# Patient Record
Sex: MALE | Race: BLACK OR AFRICAN AMERICAN | Employment: UNEMPLOYED | URBAN - METROPOLITAN AREA
[De-identification: names, ages, dates, MRNs, and addresses within clinical notes are randomized per-mention and may not be internally consistent; named-entity substitution may affect disease eponyms.]

---

## 2021-10-01 ENCOUNTER — APPOINTMENT (OUTPATIENT)
Dept: GENERAL RADIOLOGY | Age: 26
DRG: 439 | End: 2021-10-01

## 2021-10-01 ENCOUNTER — APPOINTMENT (OUTPATIENT)
Dept: CT IMAGING | Age: 26
DRG: 439 | End: 2021-10-01

## 2021-10-01 ENCOUNTER — HOSPITAL ENCOUNTER (INPATIENT)
Age: 26
LOS: 3 days | Discharge: HOME | DRG: 439 | End: 2021-10-04
Attending: INTERNAL MEDICINE | Admitting: INTERNAL MEDICINE

## 2021-10-01 DIAGNOSIS — R56.9 SEIZURE (HCC): Primary | ICD-10-CM

## 2021-10-01 DIAGNOSIS — K85.20 ALCOHOL-INDUCED ACUTE PANCREATITIS, UNSPECIFIED COMPLICATION STATUS: ICD-10-CM

## 2021-10-01 DIAGNOSIS — F10.20 ALCOHOLISM (HCC): ICD-10-CM

## 2021-10-01 DIAGNOSIS — F10.939 ALCOHOL WITHDRAWAL SYNDROME WITH COMPLICATION (HCC): ICD-10-CM

## 2021-10-01 PROBLEM — K85.90 ACUTE PANCREATITIS WITHOUT NECROSIS OR INFECTION, UNSPECIFIED: Status: ACTIVE | Noted: 2021-10-01

## 2021-10-01 LAB
A/G RATIO: 1.3 (ref 1.1–2.2)
ALBUMIN SERPL-MCNC: 4.2 G/DL (ref 3.4–5)
ALP BLD-CCNC: 71 U/L (ref 40–129)
ALT SERPL-CCNC: 26 U/L (ref 10–40)
AMPHETAMINE SCREEN, URINE: ABNORMAL
AMYLASE: 1242 U/L (ref 25–115)
ANION GAP SERPL CALCULATED.3IONS-SCNC: 13 MMOL/L (ref 3–16)
AST SERPL-CCNC: 63 U/L (ref 15–37)
BARBITURATE SCREEN URINE: ABNORMAL
BASOPHILS ABSOLUTE: 0 K/UL (ref 0–0.2)
BASOPHILS RELATIVE PERCENT: 0.1 %
BENZODIAZEPINE SCREEN, URINE: ABNORMAL
BILIRUB SERPL-MCNC: 0.5 MG/DL (ref 0–1)
BILIRUBIN URINE: NEGATIVE
BLOOD, URINE: ABNORMAL
BUN BLDV-MCNC: 9 MG/DL (ref 7–20)
CALCIUM SERPL-MCNC: 9.6 MG/DL (ref 8.3–10.6)
CANNABINOID SCREEN URINE: POSITIVE
CHLORIDE BLD-SCNC: 98 MMOL/L (ref 99–110)
CLARITY: ABNORMAL
CO2: 25 MMOL/L (ref 21–32)
COCAINE METABOLITE SCREEN URINE: ABNORMAL
COLOR: YELLOW
CREAT SERPL-MCNC: 0.7 MG/DL (ref 0.9–1.3)
EKG ATRIAL RATE: 93 BPM
EKG DIAGNOSIS: NORMAL
EKG P AXIS: 67 DEGREES
EKG P-R INTERVAL: 136 MS
EKG Q-T INTERVAL: 322 MS
EKG QRS DURATION: 86 MS
EKG QTC CALCULATION (BAZETT): 400 MS
EKG R AXIS: 69 DEGREES
EKG T AXIS: 38 DEGREES
EKG VENTRICULAR RATE: 93 BPM
EOSINOPHILS ABSOLUTE: 0 K/UL (ref 0–0.6)
EOSINOPHILS RELATIVE PERCENT: 0.1 %
EPITHELIAL CELLS, UA: 1 /HPF (ref 0–5)
ETHANOL: NORMAL MG/DL (ref 0–0.08)
GFR AFRICAN AMERICAN: >60
GFR NON-AFRICAN AMERICAN: >60
GLOBULIN: 3.2 G/DL
GLUCOSE BLD-MCNC: 97 MG/DL (ref 70–99)
GLUCOSE URINE: NEGATIVE MG/DL
HCT VFR BLD CALC: 39.6 % (ref 40.5–52.5)
HEMOGLOBIN: 13.6 G/DL (ref 13.5–17.5)
HYALINE CASTS: 4 /LPF (ref 0–8)
KETONES, URINE: NEGATIVE MG/DL
LACTIC ACID, SEPSIS: 1.2 MMOL/L (ref 0.4–1.9)
LACTIC ACID, SEPSIS: 3.9 MMOL/L (ref 0.4–1.9)
LEUKOCYTE ESTERASE, URINE: NEGATIVE
LIPASE: 1329 U/L (ref 13–60)
LYMPHOCYTES ABSOLUTE: 0.5 K/UL (ref 1–5.1)
LYMPHOCYTES RELATIVE PERCENT: 3.9 %
Lab: ABNORMAL
MCH RBC QN AUTO: 31.9 PG (ref 26–34)
MCHC RBC AUTO-ENTMCNC: 34.4 G/DL (ref 31–36)
MCV RBC AUTO: 92.6 FL (ref 80–100)
METHADONE SCREEN, URINE: ABNORMAL
MICROSCOPIC EXAMINATION: YES
MONOCYTES ABSOLUTE: 1.1 K/UL (ref 0–1.3)
MONOCYTES RELATIVE PERCENT: 9.5 %
NEUTROPHILS ABSOLUTE: 10.4 K/UL (ref 1.7–7.7)
NEUTROPHILS RELATIVE PERCENT: 86.4 %
NITRITE, URINE: NEGATIVE
OPIATE SCREEN URINE: ABNORMAL
OXYCODONE URINE: ABNORMAL
PDW BLD-RTO: 13.2 % (ref 12.4–15.4)
PH UA: 6
PH UA: 6 (ref 5–8)
PHENCYCLIDINE SCREEN URINE: ABNORMAL
PLATELET # BLD: 120 K/UL (ref 135–450)
PMV BLD AUTO: 8 FL (ref 5–10.5)
POTASSIUM REFLEX MAGNESIUM: 4.1 MMOL/L (ref 3.5–5.1)
PROPOXYPHENE SCREEN: ABNORMAL
PROTEIN UA: 30 MG/DL
RBC # BLD: 4.28 M/UL (ref 4.2–5.9)
RBC UA: 3 /HPF (ref 0–4)
SODIUM BLD-SCNC: 136 MMOL/L (ref 136–145)
SPECIFIC GRAVITY UA: 1.01 (ref 1–1.03)
TOTAL PROTEIN: 7.4 G/DL (ref 6.4–8.2)
URINE REFLEX TO CULTURE: ABNORMAL
URINE TYPE: ABNORMAL
UROBILINOGEN, URINE: 0.2 E.U./DL
WBC # BLD: 12 K/UL (ref 4–11)
WBC UA: 7 /HPF (ref 0–5)

## 2021-10-01 PROCEDURE — 6360000004 HC RX CONTRAST MEDICATION: Performed by: GENERAL ACUTE CARE HOSPITAL

## 2021-10-01 PROCEDURE — 87040 BLOOD CULTURE FOR BACTERIA: CPT

## 2021-10-01 PROCEDURE — 6360000002 HC RX W HCPCS: Performed by: INTERNAL MEDICINE

## 2021-10-01 PROCEDURE — 80053 COMPREHEN METABOLIC PANEL: CPT

## 2021-10-01 PROCEDURE — 93010 ELECTROCARDIOGRAM REPORT: CPT | Performed by: INTERNAL MEDICINE

## 2021-10-01 PROCEDURE — 70450 CT HEAD/BRAIN W/O DYE: CPT

## 2021-10-01 PROCEDURE — 85025 COMPLETE CBC W/AUTO DIFF WBC: CPT

## 2021-10-01 PROCEDURE — 2580000003 HC RX 258: Performed by: GENERAL ACUTE CARE HOSPITAL

## 2021-10-01 PROCEDURE — 99285 EMERGENCY DEPT VISIT HI MDM: CPT

## 2021-10-01 PROCEDURE — 2580000003 HC RX 258: Performed by: INTERNAL MEDICINE

## 2021-10-01 PROCEDURE — 96365 THER/PROPH/DIAG IV INF INIT: CPT

## 2021-10-01 PROCEDURE — 6370000000 HC RX 637 (ALT 250 FOR IP): Performed by: INTERNAL MEDICINE

## 2021-10-01 PROCEDURE — 1200000000 HC SEMI PRIVATE

## 2021-10-01 PROCEDURE — 2060000000 HC ICU INTERMEDIATE R&B

## 2021-10-01 PROCEDURE — 82077 ASSAY SPEC XCP UR&BREATH IA: CPT

## 2021-10-01 PROCEDURE — 96375 TX/PRO/DX INJ NEW DRUG ADDON: CPT

## 2021-10-01 PROCEDURE — U0005 INFEC AGEN DETEC AMPLI PROBE: HCPCS

## 2021-10-01 PROCEDURE — 83605 ASSAY OF LACTIC ACID: CPT

## 2021-10-01 PROCEDURE — 6360000002 HC RX W HCPCS: Performed by: GENERAL ACUTE CARE HOSPITAL

## 2021-10-01 PROCEDURE — U0003 INFECTIOUS AGENT DETECTION BY NUCLEIC ACID (DNA OR RNA); SEVERE ACUTE RESPIRATORY SYNDROME CORONAVIRUS 2 (SARS-COV-2) (CORONAVIRUS DISEASE [COVID-19]), AMPLIFIED PROBE TECHNIQUE, MAKING USE OF HIGH THROUGHPUT TECHNOLOGIES AS DESCRIBED BY CMS-2020-01-R: HCPCS

## 2021-10-01 PROCEDURE — 82150 ASSAY OF AMYLASE: CPT

## 2021-10-01 PROCEDURE — 71045 X-RAY EXAM CHEST 1 VIEW: CPT

## 2021-10-01 PROCEDURE — 2500000003 HC RX 250 WO HCPCS: Performed by: INTERNAL MEDICINE

## 2021-10-01 PROCEDURE — 96361 HYDRATE IV INFUSION ADD-ON: CPT

## 2021-10-01 PROCEDURE — 93005 ELECTROCARDIOGRAM TRACING: CPT | Performed by: GENERAL ACUTE CARE HOSPITAL

## 2021-10-01 PROCEDURE — 80307 DRUG TEST PRSMV CHEM ANLYZR: CPT

## 2021-10-01 PROCEDURE — 81001 URINALYSIS AUTO W/SCOPE: CPT

## 2021-10-01 PROCEDURE — 74177 CT ABD & PELVIS W/CONTRAST: CPT

## 2021-10-01 PROCEDURE — 6360000002 HC RX W HCPCS

## 2021-10-01 PROCEDURE — 6370000000 HC RX 637 (ALT 250 FOR IP): Performed by: GENERAL ACUTE CARE HOSPITAL

## 2021-10-01 PROCEDURE — 83690 ASSAY OF LIPASE: CPT

## 2021-10-01 RX ORDER — OXYCODONE HYDROCHLORIDE 5 MG/1
5 TABLET ORAL EVERY 6 HOURS PRN
Status: DISCONTINUED | OUTPATIENT
Start: 2021-10-01 | End: 2021-10-04 | Stop reason: HOSPADM

## 2021-10-01 RX ORDER — LORAZEPAM 1 MG/1
3 TABLET ORAL
Status: DISCONTINUED | OUTPATIENT
Start: 2021-10-01 | End: 2021-10-04 | Stop reason: HOSPADM

## 2021-10-01 RX ORDER — MORPHINE SULFATE 2 MG/ML
2 INJECTION, SOLUTION INTRAMUSCULAR; INTRAVENOUS EVERY 4 HOURS PRN
Status: DISCONTINUED | OUTPATIENT
Start: 2021-10-01 | End: 2021-10-04 | Stop reason: HOSPADM

## 2021-10-01 RX ORDER — ACETAMINOPHEN 325 MG/1
650 TABLET ORAL EVERY 6 HOURS PRN
Status: DISCONTINUED | OUTPATIENT
Start: 2021-10-01 | End: 2021-10-04 | Stop reason: HOSPADM

## 2021-10-01 RX ORDER — LORAZEPAM 2 MG/ML
1 INJECTION INTRAMUSCULAR ONCE
Status: COMPLETED | OUTPATIENT
Start: 2021-10-01 | End: 2021-10-01

## 2021-10-01 RX ORDER — 0.9 % SODIUM CHLORIDE 0.9 %
2000 INTRAVENOUS SOLUTION INTRAVENOUS ONCE
Status: COMPLETED | OUTPATIENT
Start: 2021-10-01 | End: 2021-10-01

## 2021-10-01 RX ORDER — ACETAMINOPHEN 650 MG/1
650 SUPPOSITORY RECTAL EVERY 6 HOURS PRN
Status: DISCONTINUED | OUTPATIENT
Start: 2021-10-01 | End: 2021-10-04 | Stop reason: HOSPADM

## 2021-10-01 RX ORDER — SODIUM CHLORIDE 0.9 % (FLUSH) 0.9 %
5-40 SYRINGE (ML) INJECTION PRN
Status: DISCONTINUED | OUTPATIENT
Start: 2021-10-01 | End: 2021-10-04 | Stop reason: HOSPADM

## 2021-10-01 RX ORDER — LORAZEPAM 2 MG/ML
2 INJECTION INTRAMUSCULAR
Status: DISCONTINUED | OUTPATIENT
Start: 2021-10-01 | End: 2021-10-04 | Stop reason: HOSPADM

## 2021-10-01 RX ORDER — LORAZEPAM 1 MG/1
2 TABLET ORAL
Status: DISCONTINUED | OUTPATIENT
Start: 2021-10-01 | End: 2021-10-04 | Stop reason: HOSPADM

## 2021-10-01 RX ORDER — LORAZEPAM 2 MG/ML
INJECTION INTRAMUSCULAR
Status: COMPLETED
Start: 2021-10-01 | End: 2021-10-01

## 2021-10-01 RX ORDER — ONDANSETRON 4 MG/1
4 TABLET, ORALLY DISINTEGRATING ORAL EVERY 8 HOURS PRN
Status: DISCONTINUED | OUTPATIENT
Start: 2021-10-01 | End: 2021-10-04 | Stop reason: HOSPADM

## 2021-10-01 RX ORDER — ACETAMINOPHEN 500 MG
1000 TABLET ORAL ONCE
Status: COMPLETED | OUTPATIENT
Start: 2021-10-01 | End: 2021-10-01

## 2021-10-01 RX ORDER — POLYETHYLENE GLYCOL 3350 17 G/17G
17 POWDER, FOR SOLUTION ORAL DAILY PRN
Status: DISCONTINUED | OUTPATIENT
Start: 2021-10-01 | End: 2021-10-04 | Stop reason: HOSPADM

## 2021-10-01 RX ORDER — SODIUM CHLORIDE 0.9 % (FLUSH) 0.9 %
5-40 SYRINGE (ML) INJECTION EVERY 12 HOURS SCHEDULED
Status: DISCONTINUED | OUTPATIENT
Start: 2021-10-01 | End: 2021-10-04 | Stop reason: HOSPADM

## 2021-10-01 RX ORDER — SODIUM CHLORIDE 9 MG/ML
25 INJECTION, SOLUTION INTRAVENOUS PRN
Status: DISCONTINUED | OUTPATIENT
Start: 2021-10-01 | End: 2021-10-04 | Stop reason: HOSPADM

## 2021-10-01 RX ORDER — LORAZEPAM 2 MG/ML
4 INJECTION INTRAMUSCULAR
Status: DISCONTINUED | OUTPATIENT
Start: 2021-10-01 | End: 2021-10-04 | Stop reason: HOSPADM

## 2021-10-01 RX ORDER — LEVETIRACETAM 10 MG/ML
1000 INJECTION INTRAVASCULAR ONCE
Status: COMPLETED | OUTPATIENT
Start: 2021-10-01 | End: 2021-10-01

## 2021-10-01 RX ORDER — ONDANSETRON 2 MG/ML
4 INJECTION INTRAMUSCULAR; INTRAVENOUS EVERY 6 HOURS PRN
Status: DISCONTINUED | OUTPATIENT
Start: 2021-10-01 | End: 2021-10-04 | Stop reason: HOSPADM

## 2021-10-01 RX ORDER — LORAZEPAM 1 MG/1
4 TABLET ORAL
Status: DISCONTINUED | OUTPATIENT
Start: 2021-10-01 | End: 2021-10-04 | Stop reason: HOSPADM

## 2021-10-01 RX ORDER — LORAZEPAM 2 MG/ML
3 INJECTION INTRAMUSCULAR
Status: DISCONTINUED | OUTPATIENT
Start: 2021-10-01 | End: 2021-10-04 | Stop reason: HOSPADM

## 2021-10-01 RX ORDER — LORAZEPAM 1 MG/1
1 TABLET ORAL
Status: DISCONTINUED | OUTPATIENT
Start: 2021-10-01 | End: 2021-10-04 | Stop reason: HOSPADM

## 2021-10-01 RX ORDER — LORAZEPAM 2 MG/ML
1 INJECTION INTRAMUSCULAR
Status: DISCONTINUED | OUTPATIENT
Start: 2021-10-01 | End: 2021-10-04 | Stop reason: HOSPADM

## 2021-10-01 RX ORDER — SODIUM CHLORIDE, SODIUM LACTATE, POTASSIUM CHLORIDE, CALCIUM CHLORIDE 600; 310; 30; 20 MG/100ML; MG/100ML; MG/100ML; MG/100ML
INJECTION, SOLUTION INTRAVENOUS CONTINUOUS
Status: DISCONTINUED | OUTPATIENT
Start: 2021-10-01 | End: 2021-10-04 | Stop reason: HOSPADM

## 2021-10-01 RX ADMIN — ACETAMINOPHEN 1000 MG: 500 TABLET ORAL at 12:49

## 2021-10-01 RX ADMIN — LEVETIRACETAM 1000 MG: 10 INJECTION INTRAVENOUS at 12:47

## 2021-10-01 RX ADMIN — OXYCODONE 5 MG: 5 TABLET ORAL at 21:08

## 2021-10-01 RX ADMIN — MORPHINE SULFATE 2 MG: 2 INJECTION, SOLUTION INTRAMUSCULAR; INTRAVENOUS at 22:50

## 2021-10-01 RX ADMIN — MORPHINE SULFATE 2 MG: 2 INJECTION, SOLUTION INTRAMUSCULAR; INTRAVENOUS at 18:33

## 2021-10-01 RX ADMIN — IOPAMIDOL 75 ML: 755 INJECTION, SOLUTION INTRAVENOUS at 15:19

## 2021-10-01 RX ADMIN — Medication 5 ML: at 21:09

## 2021-10-01 RX ADMIN — SODIUM CHLORIDE 2000 ML: 9 INJECTION, SOLUTION INTRAVENOUS at 12:47

## 2021-10-01 RX ADMIN — FOLIC ACID: 5 INJECTION, SOLUTION INTRAMUSCULAR; INTRAVENOUS; SUBCUTANEOUS at 18:37

## 2021-10-01 RX ADMIN — LORAZEPAM 1 MG: 2 INJECTION, SOLUTION INTRAMUSCULAR; INTRAVENOUS at 12:40

## 2021-10-01 RX ADMIN — LORAZEPAM 1 MG: 2 INJECTION INTRAMUSCULAR at 12:40

## 2021-10-01 ASSESSMENT — PAIN DESCRIPTION - PROGRESSION
CLINICAL_PROGRESSION: NOT CHANGED
CLINICAL_PROGRESSION: GRADUALLY WORSENING
CLINICAL_PROGRESSION: GRADUALLY WORSENING

## 2021-10-01 ASSESSMENT — PAIN DESCRIPTION - LOCATION
LOCATION: ABDOMEN
LOCATION: ABDOMEN

## 2021-10-01 ASSESSMENT — PAIN SCALES - GENERAL
PAINLEVEL_OUTOF10: 8
PAINLEVEL_OUTOF10: 8
PAINLEVEL_OUTOF10: 3
PAINLEVEL_OUTOF10: 8
PAINLEVEL_OUTOF10: 0
PAINLEVEL_OUTOF10: 4
PAINLEVEL_OUTOF10: 8

## 2021-10-01 ASSESSMENT — PAIN DESCRIPTION - ORIENTATION
ORIENTATION: MID
ORIENTATION: MID

## 2021-10-01 ASSESSMENT — PAIN - FUNCTIONAL ASSESSMENT
PAIN_FUNCTIONAL_ASSESSMENT: PREVENTS OR INTERFERES SOME ACTIVE ACTIVITIES AND ADLS
PAIN_FUNCTIONAL_ASSESSMENT: PREVENTS OR INTERFERES SOME ACTIVE ACTIVITIES AND ADLS

## 2021-10-01 ASSESSMENT — PAIN DESCRIPTION - FREQUENCY
FREQUENCY: CONTINUOUS
FREQUENCY: CONTINUOUS

## 2021-10-01 ASSESSMENT — PAIN DESCRIPTION - DESCRIPTORS
DESCRIPTORS: SHARP
DESCRIPTORS: SHARP

## 2021-10-01 ASSESSMENT — PAIN DESCRIPTION - PAIN TYPE
TYPE: ACUTE PAIN
TYPE: ACUTE PAIN

## 2021-10-01 ASSESSMENT — PAIN DESCRIPTION - ONSET
ONSET: ON-GOING
ONSET: ON-GOING

## 2021-10-01 NOTE — ED NOTES
Pt report called to Priyanka Stevens RN, states no questions or concerns at this time.      [facility], RN  10/01/21 2791

## 2021-10-01 NOTE — H&P
[facility]    10/1/2021 5:02 PM    Patient Information:  Marina Aponte is a 23 y.o. male [id number]  PCP:  No primary care provider on file. (Tel: None )    Chief complaint:    Chief Complaint   Patient presents with    Seizures     Paient was with his father and had 3 seizures, BS was 1074 per EMS       History of Present Illness:  Reuben Wong is a 23 y.o. male [facility] historian. Patient states has been drinking 1 bottle of vodka daily for the last couple days. Yesterday had 3 seizure-like activities where his whole body was shaking. Patient states he has not had this happen to him before to me but to the ED father states this has had happened in the past patient has never been worked up for seizures. Patient also complains of sharp 10 out of 10 epigastric pain along with nausea. Patient denies any other drug use although drug screen is positive for marijuana. Patient will not give me much further history          REVIEW OF SYSTEMS:   Noncompliant with review of systems. Past Medical History:  denies    Past Surgical History:  denies    Medications:  No current facility-administered medications on file prior to encounter. No current outpatient medications on file prior to encounter. Allergies:  No Known Allergies     Social History:  Patient Lives at home abuse of etoh        Family History:  Patient denies    Physical Exam:  /75   Pulse 82   Temp 99.6 °F (37.6 °C) (Oral)   Resp 19   SpO2 97%     General appearance:  Appears uncomofrtable.  AAOx3 lethargic  HEENT: atraumatic, Pupils equal, muscous membranes moist, no masses appreciated  Cardiovascular: Regular rate and rhythm no murmurs appreciated  Respiratory: CTAB no wheezing  Gastrointestinal: Epigastric tenderness to palpation no guarding no rigidity bowel sounds positive  EXT: no edema  Neurology: no gross focal deficts  Psychiatry: Appropriate affect. Not agitated  Skin: Warm, dry, no rashes appreciated    Labs:  CBC:   Lab Results   Component Value Date    WBC 12.0 10/01/2021    RBC 4.28 10/01/2021    HGB 13.6 10/01/2021    HCT 39.6 10/01/2021    MCV 92.6 10/01/2021    MCH 31.9 10/01/2021    MCHC 34.4 10/01/2021    RDW 13.2 10/01/2021     10/01/2021    MPV 8.0 10/01/2021     BMP:    Lab Results   Component Value Date     10/01/2021    K 4.1 10/01/2021    CL 98 10/01/2021    CO2 25 10/01/2021    BUN 9 10/01/2021    CREATININE 0.7 10/01/2021    CALCIUM 9.6 10/01/2021    GFRAA >60 10/01/2021    LABGLOM >60 10/01/2021    GLUCOSE 97 10/01/2021     CT ABDOMEN PELVIS W IV CONTRAST Additional Contrast? None   Final Result   Extensive peripancreatic inflammation and fat stranding and edema of the   pancreas consistent with acute pancreatitis. Thickening of the duodenum secondary to adjacent inflammation. Moderate amount of free pelvic fluid. Left basilar atelectasis versus airspace disease. XR CHEST PORTABLE   Final Result   No acute cardiopulmonary process         CT HEAD WO CONTRAST   Final Result   No acute intracranial abnormality. Recent imaging reviewed    Problem List  Active Problems:    Acute pancreatitis without necrosis or infection, unspecified  Resolved Problems:    * No resolved hospital problems.  *        Assessment/Plan:   Seziures: ct head negative  -  Seizure precautions  - neuro consult likely secondary to etoh abuse    Acute pancreatitis likely secondary to etoh  - npo  - ivf fluids  - iv morphine prn  - check triglycerdies  - trend lipase    etoh abuse: ciwa and iv ativan as needed  - banana bag    Thrombocytopenia  - iron b12 folate  - onc consult  - monitor  DVT prophylaxis lovenox  Code status full code        Admit as inpatient I anticipate hospitalization spanning more than two midnights for investigation and treatment of the above medically necessary diagnoses. Please note that some part of this chart was generated using Dragon dictation software. Although every effort was made to ensure the accuracy of this automated transcription, some errors in transcription may have occurred inadvertently. If you may need any clarification, please do not hesitate to contact me through [facility].        Marilyn Castaneda MD    10/1/2021 5:02 PM

## 2021-10-01 NOTE — CARE COORDINATION
Patient was not scheduled for New patient/ED Follow-Up appointment with [facility] today. Mr. Shanice Goodman is being admitted. I am unable to schedule him with a [facility] PCP at this time. Both the [facility] Referral Line ([phone]) and the [facility] phone number ([phone]) will be provided to patient for any further questions/ concerns.

## 2021-10-01 NOTE — ED NOTES
Pt transported to floor via bed by this RN. Pt transported on [facility] with seizure precautions in place throughout transport.       [address], RN  10/01/21 1183

## 2021-10-01 NOTE — ED NOTES
Pharmacy Medication History Note      List of current medications patient is taking is complete. Source of information: Patient's father     Changes made to medication list:  Medications flagged for removal (include reason, ex. noncompliance):  None     Medications removed (include reason, ex. therapy complete or physician discontinued):  None     Medications added/doses adjusted:  None     Other notes (ex. Recent course of antibiotics, Coumadin dosing):  Per patient's father, patient is not on any medications at home   Denies use of other OTC or herbal medications. Last dose times updated. No current facility-administered medications on file prior to encounter. No current outpatient medications on file prior to encounter.      Gm Paz, PharmD    PGY1 Pharmacy Resident   [id number]

## 2021-10-01 NOTE — ED NOTES
Bed: 02  Expected date:   Expected time:   Means of arrival: [facility]  Comments:  [address] RN  10/01/21 0395

## 2021-10-01 NOTE — ED PROVIDER NOTES
[facility]        Pt Name: Debbie Galvan  MRM: [id number]  [place] 1995  Date of evaluation: 10/1/2021  Provider: EZEQUIEL Ramesh CNP  PCP: No primary care provider on file. Note Started: 4:17 PM EDT       GERSON. I have evaluated this patient. My supervising physician was available for consultation. CHIEF COMPLAINT       Chief Complaint   Patient presents with    Seizures     Paient was with his father and had 3 seizures, BS was 1074 per EMS       HISTORY OF PRESENT ILLNESS   (Location, Timing/Onset, Context/Setting, Quality, Duration, Modifying Factors, Severity, Associated Signs and Symptoms)  Note limiting factors. Chief Complaint: Seizure    Debbie Galvan is a 23 y.o. male who presents to the emergency department today for evaluation after having 3 witnessed back-to-back seizures. At the time of patient arrival there is no family available to provide history of present illness. Patient arrives post ictal.     Patient's uncle to bedside approximately 90 minutes after patient's arrival- patient is originally from [place]. His uncle is a  and he is in this area as he accompanied his uncle on a work trip. Uncle states that while driving he looked over and noticed that patient was shaking. Uncle states that patient has told him that he has had 1 seizure in the past however he has never been worked up for this. Patient does have history of alcoholism. He typically drinks daily. His last drink was 2 days ago however. He does not take any prescribed daily medications. Uncle states that patient smokes marijuana but is unaware of any other potential drug use. Patient has been vaccinated against Covid. Nursing Notes were all reviewed and agreed with or any disagreements were addressed in the HPI.     REVIEW OF SYSTEMS    (2-9 systems for level 4, 10 or more for level 5)     Review of Systems distress. Breath sounds: Normal breath sounds. Abdominal:      General: Bowel sounds are normal.      Palpations: Abdomen is soft. Tenderness: There is abdominal tenderness. There is no right CVA tenderness, left CVA tenderness, guarding or rebound. Comments: Epigastric tenderness, no rebound tenderness, no guarding   Musculoskeletal:         General: No tenderness, deformity or signs of injury. Cervical back: Normal range of motion and neck supple. No rigidity or tenderness. Right lower leg: No edema. Left lower leg: No edema. Comments: Moves all 4 extremities   Skin:     General: Skin is warm. Capillary Refill: Capillary refill takes less than 2 seconds. Findings: No erythema or rash. Neurological:      Mental Status: He is confused. GCS: GCS eye subscore is 3. GCS verbal subscore is 4. GCS motor subscore is 5. Cranial Nerves: No facial asymmetry. Motor: No seizure activity.          DIAGNOSTIC RESULTS   LABS:    Labs Reviewed   CBC WITH AUTO DIFFERENTIAL - Abnormal; Notable for the following components:       Result Value    WBC 12.0 (*)     Hematocrit 39.6 (*)     Platelets 933 (*)     Neutrophils Absolute 10.4 (*)     Lymphocytes Absolute 0.5 (*)     All other components within normal limits    Narrative:     Performed at:  [facility] [address], [place], [address]   Phone [phone]   COMPREHENSIVE METABOLIC PANEL W/ REFLEX TO MG FOR LOW K - Abnormal; Notable for the following components:    Chloride 98 (*)     CREATININE 0.7 (*)     AST 63 (*)     All other components within normal limits    Narrative:     Performed at:  [facility]  [address] [facility] [address]   Phone [phone]   LIPASE - Abnormal; Notable for the following components:    Lipase 1,329.0 (*)     All other components within normal limits    Narrative:     Performed at:  [facility]) [facility]  [address]  [place], [address]   Phone [phone]   LACTATE, SEPSIS - Abnormal; Notable for the following components:    Lactic Acid, Sepsis 3.9 (*)     All other components within normal limits    Narrative:     Performed at:  [facility] [address],  [place], [address]   Phone [phone]   URINE RT REFLEX TO CULTURE - Abnormal; Notable for the following components:    Clarity, UA CLOUDY (*)     Blood, Urine TRACE (*)     Protein, UA 30 (*)     All other components within normal limits    Narrative:     Performed at:  [facility] [address],  [place], [address]   Phone [phone]   [address] - Abnormal; Notable for the following components:    Cannabinoid Scrn, Ur POSITIVE (*)     All other components within normal limits    Narrative:     Performed at:  [facility] [address], [address]   Phone [phone]   AMYLASE - Abnormal; Notable for the following components:    Amylase 1,242 (*)     All other components within normal limits    Narrative:     Julia Cesar  [facility] tel. [phone],  Chemistry results called to and read back by LEONARDO rocha, 10/01/2021  14:42, by CATHY GLASS [facility]  Performed at:  [facility] [address]  [place], [address]   Phone [phone]   MICROSCOPIC URINALYSIS - Abnormal; Notable for the following components:    WBC, UA 7 (*)     All other components within normal limits    Narrative:     Performed at:  [facility] [address],  [place], [address]   Phone [phone]   CULTURE, BLOOD 2   CULTURE, BLOOD 1   LACTATE, SEPSIS    Narrative:     Performed at:  [facility] [address],  [place], [address]   Phone [phone]   ETHANOL    Narrative:     Zack Patel. [phone],  Chemistry results called to and read back by LEONARDO rocha, 10/01/2021  14:42, by CATHY GLASS JR. [facility]  Performed at:  [facility] [address], [place], [address]   Phone [phone]   IRON AND TIBC   TRIGLYCERIDES   VITAMIN B12 & FOLATE       When ordered only abnormal lab results are displayed. All other labs were within normal range or not returned as of this dictation. EKG: When ordered, EKG's are interpreted by the Emergency Department Physician in the absence of a cardiologist.  Please see their note for interpretation of EKG. RADIOLOGY:   Non-plain film images such as CT, Ultrasound and MRI are read by the radiologist. Plain radiographic images are visualized and preliminarily interpreted by the ED Provider with the below findings:        Interpretation per the Radiologist below, if available at the time of this note:    CT ABDOMEN PELVIS W IV CONTRAST Additional Contrast? None   Final Result   Extensive peripancreatic inflammation and fat stranding and edema of the   pancreas consistent with acute pancreatitis. Thickening of the duodenum secondary to adjacent inflammation. Moderate amount of free pelvic fluid. Left basilar atelectasis versus airspace disease. XR CHEST PORTABLE   Final Result   No acute cardiopulmonary process         CT HEAD WO CONTRAST   Final Result   No acute intracranial abnormality. CT HEAD WO CONTRAST    Result Date: 10/1/2021  EXAMINATION: CT OF THE HEAD WITHOUT CONTRAST  10/1/2021 12:47 pm TECHNIQUE: CT of the head was performed without the administration of intravenous contrast. Dose modulation, iterative reconstruction, and/or weight based adjustment of the mA/kV was utilized to reduce the radiation dose to as low as reasonably achievable. COMPARISON: None.  HISTORY: ORDERING SYSTEM PROVIDED HISTORY: seizure/ams TECHNOLOGIST PROVIDED HISTORY: Reason for exam:->seizure/ams Has a \"code stroke\" or \"stroke alert\" been called? ->No Decision Support Exception - unselect if not a suspected or confirmed emergency medical condition->Emergency Medical Condition (MA) Reason for Exam: seizure/ams Acuity: Unknown Type of Exam: Unknown FINDINGS: BRAIN/VENTRICLES: There is no acute intracranial hemorrhage, mass effect or midline shift. No abnormal extra-axial fluid collection. The gray-white differentiation is maintained without evidence of an acute infarct. There is no evidence of hydrocephalus. ORBITS: The visualized portion of the orbits demonstrate no acute abnormality. SINUSES: The visualized paranasal sinuses and mastoid air cells demonstrate no acute abnormality. SOFT TISSUES/SKULL:  No acute abnormality of the visualized skull or soft tissues. No acute intracranial abnormality. CT ABDOMEN PELVIS W IV CONTRAST Additional Contrast? None    Result Date: 10/1/2021  EXAMINATION: CT OF THE ABDOMEN AND PELVIS WITH CONTRAST 10/1/2021 2:49 pm TECHNIQUE: CT of the abdomen and pelvis was performed with the administration of intravenous contrast. Multiplanar reformatted images are provided for review. Dose modulation, iterative reconstruction, and/or weight based adjustment of the mA/kV was utilized to reduce the radiation dose to as low as reasonably achievable. COMPARISON: None. HISTORY: ORDERING SYSTEM PROVIDED HISTORY: ams, elevated lipase TECHNOLOGIST PROVIDED HISTORY: Reason for exam:->ams, elevated lipase Additional Contrast?->None Decision Support Exception - unselect if not a suspected or confirmed emergency medical condition->Emergency Medical Condition (MA) Reason for Exam: ams, elevated lipase Acuity: Unknown Type of Exam: Unknown FINDINGS: CARDIOVASCULAR: The heart is normal in size. There is no pericardial effusion. The aorta and branch vessels are patent and normal in caliber. LUNG BASES: Ground-glass opacities noted in the left lung base reflecting airspace disease versus atelectasis.   There are no pleural effusions. HEPATOBILIARY: There is diffuse fatty infiltration of the liver. There are no focal hepatic lesions. There is no biliary ductal dilatation. The gallbladder is unremarkable. SPLEEN: Unremarkable. PANCREAS: There is extensive peripancreatic inflammation and fat stranding and edema of the pancreas consistent with acute pancreatitis. ADRENAL GLANDS: Unremarkable. KIDNEYS: Kidneys are normal in size and contour and demonstrate symmetric enhancement. There is no hydronephrosis or nephrolithiasis. ABDOMINAL NODES: No adenopathy is appreciated. PELVIC ORGANS: The urinary bladder is unremarkable. The prostate is unremarkable. There is a moderate amount of fluid within the pelvis. PERITONEUM/MESENTERY/BOWEL: The stomach is unremarkable. There is no bowel obstruction. The appendix is normal.  There is thickening of the duodenum secondary to adjacent inflammation. BONES/SOFT TISSUES: There is no acute osseous or soft tissue abnormality. Extensive peripancreatic inflammation and fat stranding and edema of the pancreas consistent with acute pancreatitis. Thickening of the duodenum secondary to adjacent inflammation. Moderate amount of free pelvic fluid. Left basilar atelectasis versus airspace disease. XR CHEST PORTABLE    Result Date: 10/1/2021  EXAMINATION: ONE XRAY VIEW OF THE CHEST 10/1/2021 12:38 pm COMPARISON: None. HISTORY: ORDERING SYSTEM PROVIDED HISTORY: fever/seizure TECHNOLOGIST PROVIDED HISTORY: Reason for exam:->fever/seizure Reason for Exam: Seizures (Ramón Kennedy was with his father and had 3 seizures, BS was 1074 per EMS) Acuity: Unknown Type of Exam: Unknown FINDINGS: The cardiomediastinal silhouette is normal in size. The lungs are clear. No pleural effusion or pneumothorax is present.      No acute cardiopulmonary process           PROCEDURES   Unless otherwise noted below, none     Procedures    CRITICAL CARE TIME   The total critical care time spent while evaluating and treating this patient was at least 35 minutes. This excludes time spent doing separately billable procedures. This includes time at the bedside, data interpretation, medication management, obtaining critical history from collateral sources if the patient is unable to provide it directly, and physician consultation. Specifics of interventions taken and potentially life-threatening diagnostic considerations are listed above in the medical decision making.         CONSULTS:  IP CONSULT TO SOCIAL WORK  IP CONSULT TO CRITICAL CARE  IP CONSULT TO NEUROLOGY  IP CONSULT TO HEM/ONC      EMERGENCY DEPARTMENT COURSE and DIFFERENTIAL DIAGNOSIS/MDM:   Vitals:    Vitals:    10/01/21 1600 10/01/21 1621 10/01/21 1630 10/01/21 1700   BP: (!) 143/79  137/75 (!) 143/73   Pulse: 86  82 84   Resp: 18  19    Temp:  99.6 °F (37.6 °C)     TempSrc:  Oral     SpO2: 99%  97% 97%       Patient was given the following medications:  Medications   sodium chloride flush 0.9 % injection 5-40 mL (has no administration in time range)   sodium chloride flush 0.9 % injection 5-40 mL (has no administration in time range)   0.9 % sodium chloride infusion (has no administration in time range)   LORazepam (ATIVAN) tablet 1 mg (has no administration in time range)     Or   LORazepam (ATIVAN) injection 1 mg (has no administration in time range)     Or   LORazepam (ATIVAN) tablet 2 mg (has no administration in time range)     Or   LORazepam (ATIVAN) injection 2 mg (has no administration in time range)     Or   LORazepam (ATIVAN) tablet 3 mg (has no administration in time range)     Or   LORazepam (ATIVAN) injection 3 mg (has no administration in time range)     Or   LORazepam (ATIVAN) tablet 4 mg (has no administration in time range)     Or   LORazepam (ATIVAN) injection 4 mg (has no administration in time range)   lactated ringers infusion (has no administration in time range)   morphine (PF) injection 2 mg (has no administration in time range)   sodium chloride 0.9 % 3,921 mL with folic acid 1 mg, adult multi-vitamin with vitamin k 10 mL, thiamine 100 mg (has no administration in time range)   LORazepam (ATIVAN) injection 1 mg (1 mg IntraVENous Given 10/1/21 1240)   levETIRAcetam (KEPPRA) 1000 mg/100 mL IVPB (0 mg IntraVENous Stopped 10/1/21 1337)   acetaminophen (TYLENOL) tablet 1,000 mg (1,000 mg Oral Given 10/1/21 1249)   0.9 % sodium chloride bolus (0 mLs IntraVENous Stopped 10/1/21 1430)   iopamidol (ISOVUE-370) 76 % injection 75 mL (75 mLs IntraVENous Given 10/1/21 1519)         This is a 37-year-old -American male with history of alcoholism who presents to the emergency department today via EMS for evaluation after having 3 back to back witnessed seizures. History is provided by patient's uncle approximately 80 minutes after patient arrival.  Patient's uncle is a  and patient is from [facility] and accompanied his uncle on a work trip. States that patient reported having a seizure approximately 3 months ago but was never evaluated by a physician for this. Patient arrived post ictal, tachycardic, febrile, but normotensive. He is not hypoxic. EKG was interpreted by ED physician reviewed by myself and is negative for acute ST elevation. Laboratory studies notable for a lipase greater [id number], amylase is 1200 consistent with acute pancreatitis. Initial lactic acid is 3.9, repeat is 1.2. Drug screen is positive for marijuana. EtOH level is negative. Patient's last drink of alcohol was 2 days ago. CT head interpreted by radiologist as negative for acute findings. CT abdomen pelvis interpreted by radiologist shows findings consistent with acute pancreatitis. Patient did receive IV normal saline 2 L bolus, IV Ativan 1 mg, and loading dose of IV Keppra. Patient has remained hemodynamically stable throughout ED visit. Patient did become more alert throughout ED visit. He is able to follow commands. He is cooperative. CIWA scale in place.   He has been without any further episodes of seizure-like activity while here in the ED. Patient admitted under hospitalist service. Patient and uncle are in agreement with plan of care. FINAL IMPRESSION      1. Seizure ([facility] [address]    2. Alcohol withdrawal syndrome with complication ([facility] [address]    3. Alcoholism ([facility] [address]    4. Alcohol-induced acute pancreatitis, unspecified complication status          DISPOSITION/PLAN   DISPOSITION Admitted 10/01/2021 04:45:42 PM      PATIENT REFERRED TO:  No follow-up provider specified.     DISCHARGE MEDICATIONS:  New Prescriptions    No medications on file       DISCONTINUED MEDICATIONS:  Discontinued Medications    No medications on file              (Please note that portions of this note were completed with a voice recognition program.  Efforts were made to edit the dictations but occasionally words are mis-transcribed.)    Hanley Lesches, APRN - CNP (electronically signed)            Hanley Lesches, APRN - CNP  10/01/21 3261

## 2021-10-01 NOTE — ED NOTES
Patient brought in by squad after family called reporting patient had 3 seizures in one hour  Patient alert to voice on arrival   reported by EMS  20 gauge PIV inserted in L arm  Labs obtained  EKG completed at this time  NP at bedside to evaluate patient, orders placed and patient medicated per [facility]    Patient taken to CT    Patient resting in bed, seizure pads applied to side rails, call light in reach.       Angelica Raines RN  10/01/21 2573

## 2021-10-02 LAB
A/G RATIO: 1.2 (ref 1.1–2.2)
ALBUMIN SERPL-MCNC: 3.5 G/DL (ref 3.4–5)
ALP BLD-CCNC: 60 U/L (ref 40–129)
ALT SERPL-CCNC: 21 U/L (ref 10–40)
ANION GAP SERPL CALCULATED.3IONS-SCNC: 13 MMOL/L (ref 3–16)
APTT: 34.1 SEC (ref 26.2–38.6)
AST SERPL-CCNC: 59 U/L (ref 15–37)
BASOPHILS ABSOLUTE: 0 K/UL (ref 0–0.2)
BASOPHILS RELATIVE PERCENT: 0.4 %
BILIRUB SERPL-MCNC: 0.8 MG/DL (ref 0–1)
BUN BLDV-MCNC: 7 MG/DL (ref 7–20)
CALCIUM SERPL-MCNC: 8.8 MG/DL (ref 8.3–10.6)
CHLORIDE BLD-SCNC: 102 MMOL/L (ref 99–110)
CO2: 23 MMOL/L (ref 21–32)
CREAT SERPL-MCNC: 0.6 MG/DL (ref 0.9–1.3)
D DIMER: 5032 NG/ML DDU (ref 0–229)
EOSINOPHILS ABSOLUTE: 0.1 K/UL (ref 0–0.6)
EOSINOPHILS RELATIVE PERCENT: 1.4 %
FIBRINOGEN: 298 MG/DL (ref 200–397)
FOLATE: >20 NG/ML (ref 4.78–24.2)
GFR AFRICAN AMERICAN: >60
GFR NON-AFRICAN AMERICAN: >60
GLOBULIN: 3 G/DL
GLUCOSE BLD-MCNC: 79 MG/DL (ref 70–99)
HAPTOGLOBIN: 284 MG/DL (ref 30–200)
HCT VFR BLD CALC: 36.1 % (ref 40.5–52.5)
HEMOGLOBIN: 12.6 G/DL (ref 13.5–17.5)
INR BLD: 1.08 (ref 0.88–1.12)
IRON SATURATION: 12 % (ref 20–50)
IRON: 28 UG/DL (ref 59–158)
LIPASE: 912 U/L (ref 13–60)
LYMPHOCYTES ABSOLUTE: 0.5 K/UL (ref 1–5.1)
LYMPHOCYTES RELATIVE PERCENT: 4.8 %
MAGNESIUM: 1.9 MG/DL (ref 1.8–2.4)
MCH RBC QN AUTO: 32.5 PG (ref 26–34)
MCHC RBC AUTO-ENTMCNC: 34.9 G/DL (ref 31–36)
MCV RBC AUTO: 92.9 FL (ref 80–100)
MONOCYTES ABSOLUTE: 0.7 K/UL (ref 0–1.3)
MONOCYTES RELATIVE PERCENT: 7.4 %
NEUTROPHILS ABSOLUTE: 8.6 K/UL (ref 1.7–7.7)
NEUTROPHILS RELATIVE PERCENT: 86 %
PDW BLD-RTO: 13.1 % (ref 12.4–15.4)
PLATELET # BLD: 94 K/UL (ref 135–450)
PMV BLD AUTO: 8.3 FL (ref 5–10.5)
POTASSIUM REFLEX MAGNESIUM: 3.3 MMOL/L (ref 3.5–5.1)
PROTHROMBIN TIME: 12.2 SEC (ref 9.9–12.7)
RBC # BLD: 3.89 M/UL (ref 4.2–5.9)
SARS-COV-2: NOT DETECTED
SODIUM BLD-SCNC: 138 MMOL/L (ref 136–145)
TOTAL IRON BINDING CAPACITY: 235 UG/DL (ref 260–445)
TOTAL PROTEIN: 6.5 G/DL (ref 6.4–8.2)
TRIGL SERPL-MCNC: 71 MG/DL (ref 0–150)
VITAMIN B-12: 534 PG/ML (ref 211–911)
WBC # BLD: 10 K/UL (ref 4–11)

## 2021-10-02 PROCEDURE — 85384 FIBRINOGEN ACTIVITY: CPT

## 2021-10-02 PROCEDURE — 2060000000 HC ICU INTERMEDIATE R&B

## 2021-10-02 PROCEDURE — 84478 ASSAY OF TRIGLYCERIDES: CPT

## 2021-10-02 PROCEDURE — 80053 COMPREHEN METABOLIC PANEL: CPT

## 2021-10-02 PROCEDURE — 83540 ASSAY OF IRON: CPT

## 2021-10-02 PROCEDURE — 83550 IRON BINDING TEST: CPT

## 2021-10-02 PROCEDURE — 85025 COMPLETE CBC W/AUTO DIFF WBC: CPT

## 2021-10-02 PROCEDURE — 6370000000 HC RX 637 (ALT 250 FOR IP): Performed by: INTERNAL MEDICINE

## 2021-10-02 PROCEDURE — 6360000002 HC RX W HCPCS: Performed by: INTERNAL MEDICINE

## 2021-10-02 PROCEDURE — 6360000002 HC RX W HCPCS: Performed by: GENERAL ACUTE CARE HOSPITAL

## 2021-10-02 PROCEDURE — 2580000003 HC RX 258: Performed by: INTERNAL MEDICINE

## 2021-10-02 PROCEDURE — 83010 ASSAY OF HAPTOGLOBIN QUANT: CPT

## 2021-10-02 PROCEDURE — 99254 IP/OBS CNSLTJ NEW/EST MOD 60: CPT | Performed by: PSYCHIATRY & NEUROLOGY

## 2021-10-02 PROCEDURE — 84165 PROTEIN E-PHORESIS SERUM: CPT

## 2021-10-02 PROCEDURE — 2580000003 HC RX 258: Performed by: GENERAL ACUTE CARE HOSPITAL

## 2021-10-02 PROCEDURE — 85610 PROTHROMBIN TIME: CPT

## 2021-10-02 PROCEDURE — 36415 COLL VENOUS BLD VENIPUNCTURE: CPT

## 2021-10-02 PROCEDURE — 83735 ASSAY OF MAGNESIUM: CPT

## 2021-10-02 PROCEDURE — 84155 ASSAY OF PROTEIN SERUM: CPT

## 2021-10-02 PROCEDURE — 83690 ASSAY OF LIPASE: CPT

## 2021-10-02 PROCEDURE — 85730 THROMBOPLASTIN TIME PARTIAL: CPT

## 2021-10-02 PROCEDURE — 82746 ASSAY OF FOLIC ACID SERUM: CPT

## 2021-10-02 PROCEDURE — 82607 VITAMIN B-12: CPT

## 2021-10-02 PROCEDURE — 85379 FIBRIN DEGRADATION QUANT: CPT

## 2021-10-02 RX ORDER — POTASSIUM CHLORIDE 20 MEQ/1
40 TABLET, EXTENDED RELEASE ORAL ONCE
Status: COMPLETED | OUTPATIENT
Start: 2021-10-02 | End: 2021-10-02

## 2021-10-02 RX ADMIN — MORPHINE SULFATE 2 MG: 2 INJECTION, SOLUTION INTRAMUSCULAR; INTRAVENOUS at 03:18

## 2021-10-02 RX ADMIN — SODIUM CHLORIDE, POTASSIUM CHLORIDE, SODIUM LACTATE AND CALCIUM CHLORIDE: 600; 310; 30; 20 INJECTION, SOLUTION INTRAVENOUS at 04:52

## 2021-10-02 RX ADMIN — Medication 10 ML: at 21:23

## 2021-10-02 RX ADMIN — OXYCODONE 5 MG: 5 TABLET ORAL at 06:06

## 2021-10-02 RX ADMIN — MORPHINE SULFATE 2 MG: 2 INJECTION, SOLUTION INTRAMUSCULAR; INTRAVENOUS at 23:50

## 2021-10-02 RX ADMIN — SODIUM CHLORIDE, POTASSIUM CHLORIDE, SODIUM LACTATE AND CALCIUM CHLORIDE: 600; 310; 30; 20 INJECTION, SOLUTION INTRAVENOUS at 22:17

## 2021-10-02 RX ADMIN — LORAZEPAM 2 MG: 2 INJECTION INTRAMUSCULAR; INTRAVENOUS at 14:22

## 2021-10-02 RX ADMIN — OXYCODONE 5 MG: 5 TABLET ORAL at 22:15

## 2021-10-02 RX ADMIN — ONDANSETRON 4 MG: 2 INJECTION INTRAMUSCULAR; INTRAVENOUS at 03:18

## 2021-10-02 RX ADMIN — Medication 10 ML: at 23:50

## 2021-10-02 RX ADMIN — SODIUM CHLORIDE, POTASSIUM CHLORIDE, SODIUM LACTATE AND CALCIUM CHLORIDE: 600; 310; 30; 20 INJECTION, SOLUTION INTRAVENOUS at 14:20

## 2021-10-02 RX ADMIN — ONDANSETRON 4 MG: 2 INJECTION INTRAMUSCULAR; INTRAVENOUS at 23:49

## 2021-10-02 RX ADMIN — ENOXAPARIN SODIUM 40 MG: 40 INJECTION SUBCUTANEOUS at 09:28

## 2021-10-02 RX ADMIN — POTASSIUM CHLORIDE 40 MEQ: 1500 TABLET, EXTENDED RELEASE ORAL at 12:31

## 2021-10-02 ASSESSMENT — PAIN SCALES - GENERAL
PAINLEVEL_OUTOF10: 4
PAINLEVEL_OUTOF10: 7
PAINLEVEL_OUTOF10: 0
PAINLEVEL_OUTOF10: 3
PAINLEVEL_OUTOF10: 3
PAINLEVEL_OUTOF10: 5
PAINLEVEL_OUTOF10: 0
PAINLEVEL_OUTOF10: 3
PAINLEVEL_OUTOF10: 7
PAINLEVEL_OUTOF10: 9

## 2021-10-02 ASSESSMENT — PAIN - FUNCTIONAL ASSESSMENT: PAIN_FUNCTIONAL_ASSESSMENT: PREVENTS OR INTERFERES SOME ACTIVE ACTIVITIES AND ADLS

## 2021-10-02 ASSESSMENT — PAIN DESCRIPTION - PROGRESSION
CLINICAL_PROGRESSION: GRADUALLY WORSENING
CLINICAL_PROGRESSION: RAPIDLY WORSENING
CLINICAL_PROGRESSION: GRADUALLY WORSENING

## 2021-10-02 ASSESSMENT — PAIN DESCRIPTION - PAIN TYPE: TYPE: ACUTE PAIN

## 2021-10-02 ASSESSMENT — PAIN DESCRIPTION - LOCATION: LOCATION: ABDOMEN

## 2021-10-02 ASSESSMENT — PAIN DESCRIPTION - DESCRIPTORS: DESCRIPTORS: ACHING;CRAMPING

## 2021-10-02 ASSESSMENT — PAIN DESCRIPTION - ONSET: ONSET: ON-GOING

## 2021-10-02 ASSESSMENT — PAIN DESCRIPTION - ORIENTATION: ORIENTATION: MID

## 2021-10-02 ASSESSMENT — PAIN DESCRIPTION - FREQUENCY: FREQUENCY: CONTINUOUS

## 2021-10-02 NOTE — PROGRESS NOTES
[facility]    10/2/2021 9:47 AM        Name: Quyen Daniel . Admitted: 10/1/2021  Primary Care Provider: No primary care provider on file. (Tel: None)        Subjective:     In bed abdominal pain improving no further nausea vomiting feeling better and feeling hungry    Reviewed interval ancillary notes    Current Medications  potassium chloride (KLOR-CON M) extended release tablet 40 mEq, Once  sodium chloride flush 0.9 % injection 5-40 mL, 2 times per day  sodium chloride flush 0.9 % injection 5-40 mL, PRN  0.9 % sodium chloride infusion, PRN  LORazepam (ATIVAN) tablet 1 mg, Q1H PRN   Or  LORazepam (ATIVAN) injection 1 mg, Q1H PRN   Or  LORazepam (ATIVAN) tablet 2 mg, Q1H PRN   Or  LORazepam (ATIVAN) injection 2 mg, Q1H PRN   Or  LORazepam (ATIVAN) tablet 3 mg, Q1H PRN   Or  LORazepam (ATIVAN) injection 3 mg, Q1H PRN   Or  LORazepam (ATIVAN) tablet 4 mg, Q1H PRN   Or  LORazepam (ATIVAN) injection 4 mg, Q1H PRN  lactated ringers infusion, Continuous  morphine (PF) injection 2 mg, Q4H PRN  sodium chloride flush 0.9 % injection 5-40 mL, 2 times per day  sodium chloride flush 0.9 % injection 5-40 mL, PRN  0.9 % sodium chloride infusion, PRN  enoxaparin (LOVENOX) injection 40 mg, Daily  ondansetron (ZOFRAN-ODT) disintegrating tablet 4 mg, Q8H PRN   Or  ondansetron (ZOFRAN) injection 4 mg, Q6H PRN  polyethylene glycol (GLYCOLAX) packet 17 g, Daily PRN  acetaminophen (TYLENOL) tablet 650 mg, Q6H PRN   Or  acetaminophen (TYLENOL) suppository 650 mg, Q6H PRN  oxyCODONE (ROXICODONE) immediate release tablet 5 mg, Q6H PRN        Objective:  BP (!) 145/83   Pulse 75   Temp 98.5 °F (36.9 °C) (Temporal)   Resp 22   Ht 6' 2\" (1.88 m)   Wt 187 lb 1.6 oz (84.9 kg)   SpO2 100%   BMI 24.02 kg/m²     Intake/Output Summary (Last 24 hours) at 10/2/2021 0947  Last data filed at 10/2/2021 0615  Gross per 24 hour   Intake 1000 ml   Output 1200 ml   Net -200 ml      Wt Readings from Last 3 Encounters:   10/02/21 187 lb 1.6 oz (84.9 kg)       General appearance:  Appears comfortable. AAOx3  HEENT: atraumatic, Pupils equal, muscous membranes moist, no masses appreciated  Cardiovascular: Regular rate and rhythm no murmurs appreciated  Respiratory: CTAB no wheezing  Gastrointestinal: Epigastric tenderness to palpation no guarding no rigidity bowel sounds positive  EXT: no edema  Neurology: no gross focal deficts  Psychiatry: Appropriate affect. Not agitated  Skin: Warm, dry, no rashes appreciated    Labs and Tests:  CBC:   Recent Labs     10/01/21  1238 10/02/21  0435   WBC 12.0* 10.0   HGB 13.6 12.6*   * 94*     BMP:    Recent Labs     10/01/21  1238 10/02/21  0435    138   K 4.1 3.3*   CL 98* 102   CO2 25 23   BUN 9 7   CREATININE 0.7* 0.6*   GLUCOSE 97 79     Hepatic:   Recent Labs     10/01/21  1238 10/02/21  0435   AST 63* 59*   ALT 26 21   BILITOT 0.5 0.8   ALKPHOS 71 60     CT ABDOMEN PELVIS W IV CONTRAST Additional Contrast? None   Final Result   Extensive peripancreatic inflammation and fat stranding and edema of the   pancreas consistent with acute pancreatitis. Thickening of the duodenum secondary to adjacent inflammation. Moderate amount of free pelvic fluid. Left basilar atelectasis versus airspace disease. XR CHEST PORTABLE   Final Result   No acute cardiopulmonary process         CT HEAD WO CONTRAST   Final Result   No acute intracranial abnormality. Recent imaging reviewed    Problem List  Active Problems:    Acute pancreatitis without necrosis or infection, unspecified  Resolved Problems:    * No resolved hospital problems.  *       Assessment/Plan:   Seziures: ct head negative  -  Seizure precautions  - neuro consult pending likely secondary to etoh abuse     Acute pancreatitis likely secondary to etoh  - start clears  - ivf fluids  - iv morphine prn  - check triglycerdies pending  - lipase trending down     etoh abuse: ciwa and iv ativan as needed  - banana bag     Thrombocytopenia  - iron b12 folate pending  - onc consult pending  - trending down but 94 contionue to monitor  DVT prophylaxis lovenox  Code status full code         Tressie Blizzard, MD   10/2/2021 9:47 AM

## 2021-10-02 NOTE — CONSULTS
Hematology Consult    See dictation    A/P:  1. TCP. Likely due to bone marrow suppression from his alcohol abuse. Check labs. Will follow for now. No concern for TTP or DIC at this time. Thanks for the consult. Will follow closely.     Xena Regalado MD

## 2021-10-02 NOTE — FLOWSHEET NOTE
10/02/21 0749   Provider Notification   Reason for Communication Review case   Provider Name [facility]   Provider Notification Physician   Method of Communication Secure Message   Response Waiting for response   Notification Time [phone]   K= is 3.3 this AM , do you want replacement. Pt has pancreatitis.

## 2021-10-02 NOTE — CONSULTS
In patient Neurology consult        [facility] Neurology      Lory Tobin MD      Giacomo Kodi  1995    Date of Service: 10/2/2021    Referring Physician: Susy Moreland MD      Reason for the consult and CC: Acute encephalopathy and recurrent seizures. HPI:   The patient is a 23y.o.  years old male with history of substance abuse and alcohol abuse who was admitted to the hospital yesterday with witnessed seizure-like activity. Symptoms started on day of admission. Description was generalized body seizure with tonic-clonic activity for few minutes. Degree was severe. Frequency 2 or 3 at least before coming to the hospital.  No other leaving or aggravating factors. No aura. No tongue biting or bladder incontinence. Other associated symptoms including epigastric pain. He has been drinking daily for the last 2 weeks. When he came to the ED, he was somewhat postictal however initial imaging showed no acute abnormalities. He was admitted to the hospital with recurrent seizure and pancreatitis. Today he is awake and alert. He denies any headache or focal weakness. Still complaining of abdominal pain. UDS was positive for cannabinoid. Blood test reviewed today and discussed also with the patient. He he describes having seizure in 2019 in setting of substance abuse. No family history of epilepsy or history of trauma with LOC, febrile convulsion or childhood seizures. No other new symptoms today.   Other review of system was unremarkable    Family history: Noncontributory    Surgical history: Noncontributory    Past medical history: Substance abuse and alcohol abuse    Social history: He drinks and smokes        No Known Allergies  Current Facility-Administered Medications   Medication Dose Route Frequency Provider Last Rate Last Admin    potassium chloride (KLOR-CON M) extended release tablet 40 mEq  40 mEq Oral Once Susy Moreland MD        sodium chloride flush 0.9 % injection 5-40 mL  5-40 mL IntraVENous 2 times per day Lassiterlesia Head, APRN - CNP   5 mL at 10/01/21 2109    sodium chloride flush 0.9 % injection 5-40 mL  5-40 mL IntraVENous PRN Lassiter Adilene, APRN - CNP        0.9 % sodium chloride infusion  25 mL IntraVENous PRN Lassiter Adilene, APRN - CNP        LORazepam (ATIVAN) tablet 1 mg  1 mg Oral Q1H PRN Lassiter Adilene, APRN - CNP        Or    LORazepam (ATIVAN) injection 1 mg  1 mg IntraVENous Q1H PRN Lassiter Adilene, APRN - CNP        Or    LORazepam (ATIVAN) tablet 2 mg  2 mg Oral Q1H PRN Lassiter Adilene, APRN - CNP        Or    LORazepam (ATIVAN) injection 2 mg  2 mg IntraVENous Q1H PRN Lassiter Adilene, APRN - CNP        Or    LORazepam (ATIVAN) tablet 3 mg  3 mg Oral Q1H PRN Lassiter Adilene, APRN - CNP        Or    LORazepam (ATIVAN) injection 3 mg  3 mg IntraVENous Q1H PRN Lassiter Adilene, APRN - CNP        Or    LORazepam (ATIVAN) tablet 4 mg  4 mg Oral Q1H PRN Lassiter Adilene, APRN - CNP        Or    LORazepam (ATIVAN) injection 4 mg  4 mg IntraVENous Q1H PRN Maikol Adilene, APRN - CNP        lactated ringers infusion   IntraVENous Continuous Sharalyn Spurling,  mL/hr at 10/02/21 0452 New Bag at 10/02/21 0452    morphine (PF) injection 2 mg  2 mg IntraVENous Q4H PRN Sharalyn Spurling, MD   2 mg at 10/02/21 0318    sodium chloride flush 0.9 % injection 5-40 mL  5-40 mL IntraVENous 2 times per day Sharalyn Spurling, MD   5 mL at 10/01/21 2109    sodium chloride flush 0.9 % injection 5-40 mL  5-40 mL IntraVENous PRN Sharalyn Spurling, MD        0.9 % sodium chloride infusion  25 mL IntraVENous PRN Sharalyn Spurling, MD        enoxaparin (LOVENOX) injection 40 mg  40 mg SubCUTAneous Daily Sharalyn Spurling, MD   40 mg at 10/02/21 0928    ondansetron (ZOFRAN-ODT) disintegrating tablet 4 mg  4 mg Oral Q8H PRN Sharalyn Spurling, MD        Or    ondansetron (ZOFRAN) injection 4 mg  4 mg IntraVENous Q6H PRN Sharalyn Spurling, MD   4 mg at 10/02/21 0318    polyethylene glycol (GLYCOLAX) packet 17 g  17 g Oral Daily PRN Charlotte Lora MD        acetaminophen (TYLENOL) tablet 650 mg  650 mg Oral Q6H PRN Charlotte Lora MD        Or   Abelardosiria Lewis acetaminophen (TYLENOL) suppository 650 mg  650 mg Rectal Q6H PRN Charlotte Lora MD        oxyCODONE (ROXICODONE) immediate release tablet 5 mg  5 mg Oral Q6H PRN Rayleen Goldberg, MD   5 mg at 10/02/21 0606       ROS : A 10-14 system review of constitutional, cardiovascular, respiratory, eyes, musculoskeletal, endocrine, GI, ENT, skin, hematological, genitourinary, psychiatric and neurologic systems was obtained and updated today and is unremarkable except as mentioned in my HPI      Exam:     Constitutional:   Vitals:    10/02/21 0000 10/02/21 0400 10/02/21 0615 10/02/21 0926   BP: 133/68 (!) 146/81  (!) 145/83   Pulse: 71 85  75   Resp: 14 20  22   Temp: 98.7 °F (37.1 °C) 98.6 °F (37 °C)  98.5 °F (36.9 °C)   TempSrc: Temporal Temporal  Temporal   SpO2: 100% 98%  100%   Weight:   187 lb 1.6 oz (84.9 kg)    Height:           General appearance:  Normal development and appear in no acute distress. Eye:  Fundus of the eye: Funduscopic examination cannot be performed due to COVID19 restrictions and precautions. Neck: supple  Cardiovascular: No lower leg edema with good pulsation. Mental Status:   Oriented to person, place, problem, and time. Memory: Good immediate recall. Intact remote memory  Poor attention span and concentration. Language: intact naming, repeating and fluency   Good fund of Knowledge. Aware of current events and vocabulary   Cranial Nerves:   II: Visual fields: Full. Pupils: equal, round, reactive to light  III,IV,VI: Extra Ocular Movements are intact. No nystagmus  V: Facial sensation is intact per patient  VII: Facial strength and movements: intact and symmetric  VIII: Hearing: Intact  IX: Palate elevation is symmetric  XI: Shoulder shrug is intact  XII: Tongue movements are normal  Musculoskeletal: 5/5 in all 4 extremities.    Tone: Normal tone. Reflexes: Symmetric throughout  Planters: Equivocal  Coordination: No tremors. Sensation: normal to all modalities in both arms and legs. Gait/Posture: Not tested due to seizure. Data:  LABS:   Lab Results   Component Value Date     10/02/2021    K 3.3 10/02/2021     10/02/2021    CO2 23 10/02/2021    BUN 7 10/02/2021    CREATININE 0.6 10/02/2021    GFRAA >60 10/02/2021    LABGLOM >60 10/02/2021    GLUCOSE 79 10/02/2021    MG 1.90 10/02/2021    CALCIUM 8.8 10/02/2021     Lab Results   Component Value Date    WBC 10.0 10/02/2021    RBC 3.89 10/02/2021    HGB 12.6 10/02/2021    HCT 36.1 10/02/2021    MCV 92.9 10/02/2021    RDW 13.1 10/02/2021    PLT 94 10/02/2021   No results found for: INR, PROTIME    Neuroimaging were independently reviewed by me and discussed results with the patient  Reviewed notes from different physicians  Reviewed lab and blood testing    Impression:  Acute encephalopathy and recurrent seizures in a patient with history of alcohol abuse. Likely due to alcohol intoxication and substance abuse. ? Underlying epilepsy although seems less likely. Would hold off any AED at this point giving risk outweighs the benefit in the setting of acute pancreatitis which can be exacerbated by some of the new AED. Acute pancreatitis   alcohol abuse  Substance abuse      Recommendation:  DT precautions  Continue current supportive care  Continue current work-up for his pancreatitis  Seizure precautions  No need to start AED at this point  Discussed with the patient risk of recurrence, status epilepticus and sudden death. No driving until he is cleared from his physician. He voiced understanding  Will need MRI brain and EEG at some point after medically stable and pancreatitis improved. This can be done even in outpatient setting  Continue follow-up LFT, lipase and CBC  DC planning after the above work-up        Thank you for referring such patient.  If you have any questions regarding my consult note, please don't hesitate to call me. Lory Tobin MD  [phone]    This dictation was generated by voice recognition computer software.  Although all attempts are made to edit the dictation for accuracy, there may be errors in the  transcription that are not intended

## 2021-10-02 NOTE — PLAN OF CARE
Problem: Pain:  Goal: Control of acute pain  Description: Control of acute pain  Outcome: Ongoing   Will medicate as needed      Problem: Discharge Planning:  Goal: Discharged to appropriate level of care  Description: Discharged to appropriate level of care  Outcome: Ongoing   Pt from [place], [place] . Was riding with Uncle , who is a . Uncle came to see Pt. Uncle going back to [place]. and will be back this way Monday and hopes Pt will be discharged so he can take Pt back to [place]. Problem: Fluid Volume - Deficit:  Goal: Absence of fluid volume deficit signs and symptoms  Description: Absence of fluid volume deficit signs and symptoms  Outcome: Ongoing   IVF . Misael clear liquid diet.

## 2021-10-03 LAB
A/G RATIO: 1.1 (ref 1.1–2.2)
ALBUMIN SERPL-MCNC: 3.5 G/DL (ref 3.4–5)
ALP BLD-CCNC: 100 U/L (ref 40–129)
ALT SERPL-CCNC: 30 U/L (ref 10–40)
AMYLASE: 521 U/L (ref 25–115)
ANION GAP SERPL CALCULATED.3IONS-SCNC: 9 MMOL/L (ref 3–16)
AST SERPL-CCNC: 73 U/L (ref 15–37)
BASOPHILS ABSOLUTE: 0 K/UL (ref 0–0.2)
BASOPHILS RELATIVE PERCENT: 0.3 %
BILIRUB SERPL-MCNC: 1.1 MG/DL (ref 0–1)
BUN BLDV-MCNC: 3 MG/DL (ref 7–20)
CALCIUM SERPL-MCNC: 8.9 MG/DL (ref 8.3–10.6)
CHLORIDE BLD-SCNC: 96 MMOL/L (ref 99–110)
CO2: 29 MMOL/L (ref 21–32)
CREAT SERPL-MCNC: 0.7 MG/DL (ref 0.9–1.3)
EOSINOPHILS ABSOLUTE: 0.3 K/UL (ref 0–0.6)
EOSINOPHILS RELATIVE PERCENT: 3.2 %
GFR AFRICAN AMERICAN: >60
GFR NON-AFRICAN AMERICAN: >60
GLOBULIN: 3.3 G/DL
GLUCOSE BLD-MCNC: 141 MG/DL (ref 70–99)
HCT VFR BLD CALC: 34.8 % (ref 40.5–52.5)
HEMOGLOBIN: 12.2 G/DL (ref 13.5–17.5)
LIPASE: 296 U/L (ref 13–60)
LYMPHOCYTES ABSOLUTE: 1 K/UL (ref 1–5.1)
LYMPHOCYTES RELATIVE PERCENT: 10.4 %
MCH RBC QN AUTO: 32.6 PG (ref 26–34)
MCHC RBC AUTO-ENTMCNC: 35 G/DL (ref 31–36)
MCV RBC AUTO: 93.3 FL (ref 80–100)
MONOCYTES ABSOLUTE: 1.1 K/UL (ref 0–1.3)
MONOCYTES RELATIVE PERCENT: 11 %
NEUTROPHILS ABSOLUTE: 7.6 K/UL (ref 1.7–7.7)
NEUTROPHILS RELATIVE PERCENT: 75.1 %
PDW BLD-RTO: 13.3 % (ref 12.4–15.4)
PLATELET # BLD: 97 K/UL (ref 135–450)
PMV BLD AUTO: 8.4 FL (ref 5–10.5)
POTASSIUM REFLEX MAGNESIUM: 3.6 MMOL/L (ref 3.5–5.1)
RBC # BLD: 3.73 M/UL (ref 4.2–5.9)
SODIUM BLD-SCNC: 134 MMOL/L (ref 136–145)
TOTAL PROTEIN: 6.8 G/DL (ref 6.4–8.2)
WBC # BLD: 10.1 K/UL (ref 4–11)

## 2021-10-03 PROCEDURE — 36415 COLL VENOUS BLD VENIPUNCTURE: CPT

## 2021-10-03 PROCEDURE — 6360000002 HC RX W HCPCS: Performed by: INTERNAL MEDICINE

## 2021-10-03 PROCEDURE — 6370000000 HC RX 637 (ALT 250 FOR IP): Performed by: GENERAL ACUTE CARE HOSPITAL

## 2021-10-03 PROCEDURE — 80053 COMPREHEN METABOLIC PANEL: CPT

## 2021-10-03 PROCEDURE — 85025 COMPLETE CBC W/AUTO DIFF WBC: CPT

## 2021-10-03 PROCEDURE — 82150 ASSAY OF AMYLASE: CPT

## 2021-10-03 PROCEDURE — 6370000000 HC RX 637 (ALT 250 FOR IP): Performed by: INTERNAL MEDICINE

## 2021-10-03 PROCEDURE — 2580000003 HC RX 258: Performed by: INTERNAL MEDICINE

## 2021-10-03 PROCEDURE — 83690 ASSAY OF LIPASE: CPT

## 2021-10-03 PROCEDURE — 2060000000 HC ICU INTERMEDIATE R&B

## 2021-10-03 RX ADMIN — SODIUM CHLORIDE, POTASSIUM CHLORIDE, SODIUM LACTATE AND CALCIUM CHLORIDE: 600; 310; 30; 20 INJECTION, SOLUTION INTRAVENOUS at 06:36

## 2021-10-03 RX ADMIN — ACETAMINOPHEN 650 MG: 325 TABLET ORAL at 11:40

## 2021-10-03 RX ADMIN — SODIUM CHLORIDE, POTASSIUM CHLORIDE, SODIUM LACTATE AND CALCIUM CHLORIDE: 600; 310; 30; 20 INJECTION, SOLUTION INTRAVENOUS at 17:12

## 2021-10-03 RX ADMIN — LORAZEPAM 2 MG: 1 TABLET ORAL at 01:25

## 2021-10-03 RX ADMIN — ENOXAPARIN SODIUM 40 MG: 40 INJECTION SUBCUTANEOUS at 09:07

## 2021-10-03 RX ADMIN — OXYCODONE 5 MG: 5 TABLET ORAL at 10:21

## 2021-10-03 ASSESSMENT — PAIN SCALES - GENERAL
PAINLEVEL_OUTOF10: 0
PAINLEVEL_OUTOF10: 5
PAINLEVEL_OUTOF10: 3
PAINLEVEL_OUTOF10: 3
PAINLEVEL_OUTOF10: 0
PAINLEVEL_OUTOF10: 3
PAINLEVEL_OUTOF10: 3
PAINLEVEL_OUTOF10: 0

## 2021-10-03 ASSESSMENT — PAIN DESCRIPTION - DESCRIPTORS: DESCRIPTORS: DISCOMFORT;DULL

## 2021-10-03 ASSESSMENT — PAIN DESCRIPTION - LOCATION: LOCATION: OTHER (COMMENT)

## 2021-10-03 NOTE — CONSULTS
[facility]                     [facility], [address]                                  CONSULTATION    PATIENT NAME: Hillary Marsh                      :        1995  MED REC NO:   [id number]                          ROOM:       8014  ACCOUNT NO:   [de-identified]                           ADMIT DATE: 10/01/2021  PROVIDER:     Andrey Cunningham MD    HEMATOLOGY CONSULTATION    CONSULT DATE:  10/02/2021    REASON FOR CONSULTATION:  Thrombocytopenia. CONSULTING PROVIDER:  Josselyn Mcdonald MD    HISTORY OF PRESENT ILLNESS:  The patient is a 82-year-old gentleman with  history of alcohol abuse who presented to the hospital after having two  to three seizures at home. He has a history of alcohol abuse and has  drunk one bottle of vodka daily for the last few days leading up to the  admission. He also is having abdominal pain and his amylase and lipase  were significantly elevated and his CT scan showed signs of  pancreatitis. His platelet count was 94 today and Hematology was  consulted for further workup and management of his thrombocytopenia. He  is feeling a bit better today. He has had no seizures today. His  abdominal pain is improving. PAST MEDICAL HISTORY:  1. Alcohol abuse. 2.  Alcohol-induced seizures. PAST SURGICAL HISTORY:  None. ALLERGIES:  He has no known drug allergies. HOME MEDICATIONS:  He is on no home medicines. SOCIAL HISTORY:  He is single. He has been drinking one bottle of vodka  daily lately. He smokes a half a pack a day. He is unemployed. FAMILY HISTORY:  Noncontributory.     REVIEW OF SYSTEMS:  He denies any recent fever, chills, sweats,  shortness of breath, chest pain, headaches, any new bone aches,  dysphagia, odynophagia, diarrhea, constipation, hemoptysis, hematemesis,  change in vision/hearing/smell/taste, weakness, neuropathy, skin rashes,  productive cough, urinary or bowel prolapse or incontinence, petechiae,  purpura, skin rashes, pruritus, hallucinations, nasal congestion or  drainage, seizures, strokes, syncope, depression, anxiety, suicidal  ideations, melena, or hematochezia. He has mild to moderate fatigue. His abdominal pain is improving. His 10-system review of systems is  otherwise negative. PHYSICAL EXAMINATION:  VITAL SIGNS:  He is afebrile with normal vital signs. GENERAL:  He is in no acute distress. HEENT:  His pupils are round and reactive to light and accommodation. Extraocular muscles are intact. NECK:  He has no jugular venous distention. No thyromegaly. His  oropharynx is clear. He has no carotid bruits. He has no palpable  lymphadenopathy. HEART:  Regular rate and rhythm. LUNGS:  Clear to auscultation bilaterally. ABDOMEN:  Nondistended with mild epigastric tenderness. He has bowel  sounds x4. No hepatosplenomegaly. EXTREMITIES:  He has no peripheral clubbing, cyanosis, or edema. NEUROLOGIC:  Exam is nonfocal.    LABORATORY DATA:  His white blood cell count is 10, hemoglobin 12.6,  platelets are 94. ASSESSMENT AND PLAN:  Thrombocytopenia. He is not on any medications  that can cause this and does not have hepatosplenomegaly on examination. The most likely cause is bone marrow suppression from his significant  alcohol abuse. He drinks one bottle of vodka daily. I will check a  haptoglobin. If normal, this essentially rules out TTP. I have no  suspicion for TTP. I have no suspicion for DIC but I will check for  labs for this as well. His B12 and folate were normal.  I will check an  SPEP to rule out myeloma. Thank you for the consultation. We will follow closely.         Miles Dent MD    D: 10/02/2021 11:04:34       T: 10/02/2021 12:51:06     [email or web address]  Job#: [id number]     Doc#: [id number]    CC:

## 2021-10-03 NOTE — PROGRESS NOTES
2200: pt arrived to floor from [facility], VSS telemetry applied, pt sinus rhythm. Pt c/o pain at 7/10, Oxycodone given, pt also given jello. Assessment completed. POC discussed, will need reinforcement. 2345: pt nauseated and vomiting, fellow RN administered Zofran and Morphine for pain at 7/10.     0015: pt feeling better, pain has improved and pt resting in bed at this time. No further requests. 0120: pt set off bed alarm sitting up on the side of the bed, pt very restless and fidgeting with the IV. CIWA scale done and pt is currently a 12 b/c of the restlessness and fidgeting, pt is sweating a little bit and has had nausea and vomiting, mentions a slight headache as well. 2mg of ativan given. Pt states that his pain has improved to a 3/10.     0413: pt up to the bedside to use the urinal. Pt states that he feels better and that he wants to go back to sleep.

## 2021-10-03 NOTE — PROGRESS NOTES
Hematology Oncology Daily Progress Note    Admit Date: 10/1/2021  Hospital day a few    Subjective:     Patient has complaints of mild to mod fatigue--denies sob/cp/seizures. Medication side effects: none    Scheduled Meds:   sodium chloride flush  5-40 mL IntraVENous 2 times per day    sodium chloride flush  5-40 mL IntraVENous 2 times per day    enoxaparin  40 mg SubCUTAneous Daily     Continuous Infusions:   sodium chloride      lactated ringers 125 mL/hr at 10/03/21 0636    sodium chloride       PRN Meds:sodium chloride flush, sodium chloride, LORazepam **OR** LORazepam **OR** LORazepam **OR** LORazepam **OR** LORazepam **OR** LORazepam **OR** LORazepam **OR** LORazepam, morphine, sodium chloride flush, sodium chloride, ondansetron **OR** ondansetron, polyethylene glycol, acetaminophen **OR** acetaminophen, oxyCODONE    Review of Systems  Pertinent items are noted in HPI. REVIEW OF SYSTEMS:         · Constitutional: Denies fever, sweats, weight loss     · Eyes: No visual changes or diplopia. No scleral icterus. · ENT: No Headaches, hearing loss or vertigo. No mouth sores or sore throat. · Cardiovascular: No chest pain, dyspnea on exertion, palpitations or loss of consciousness. · Respiratory: No cough or wheezing, no sputum production. No hemoptysis. .    · Gastrointestinal: No abdominal pain, appetite loss, blood in stools. No change in bowel habits. · Genitourinary: No dysuria, trouble voiding, or hematuria. · Musculoskeletal:  Generalized weakness. No joint complaints. · Integumentary: No rash or pruritis. · Neurological: No headache, diplopia. No change in gait, balance, or coordination. No paresthesias. · Endocrine: No temperature intolerance. No excessive thirst, fluid intake, or urination. · Hematologic/Lymphatic: No abnormal bruising or ecchymoses, blood clots or swollen lymph nodes. · Allergic/Immunologic: No nasal congestion or hives.    ·     Objective:     Patient Vitals for the without necrosis or infection, unspecified    Seizure ([facility] [address]    Alcohol withdrawal syndrome with complication ([facility] [address]  Resolved Problems:    * No resolved hospital problems. *      Plan:     1. Thrombocytopenia. CBC from today pending. I have no suspicion for TTP given normal haptoglobin. DIC labs negative. This is likely due to bone marrow suppression from alcohol abuse. 2. Anemia--Labs negative so far. As above. 3. Pancreatitis--clinically improving    4. Seizure--Neuro following.         Electronically signed by Bee Suresh MD on 10/3/2021 at 9:43 AM

## 2021-10-04 VITALS
HEIGHT: 74 IN | HEART RATE: 92 BPM | SYSTOLIC BLOOD PRESSURE: 132 MMHG | RESPIRATION RATE: 18 BRPM | WEIGHT: 184.5 LBS | OXYGEN SATURATION: 97 % | DIASTOLIC BLOOD PRESSURE: 83 MMHG | TEMPERATURE: 98.1 F | BODY MASS INDEX: 23.68 KG/M2

## 2021-10-04 LAB
A/G RATIO: 1.1 (ref 1.1–2.2)
ALBUMIN SERPL-MCNC: 3.1 G/DL (ref 3.1–4.9)
ALBUMIN SERPL-MCNC: 4.1 G/DL (ref 3.4–5)
ALP BLD-CCNC: 121 U/L (ref 40–129)
ALPHA-1-GLOBULIN: 0.4 G/DL (ref 0.2–0.4)
ALPHA-2-GLOBULIN: 0.9 G/DL (ref 0.4–1.1)
ALT SERPL-CCNC: 35 U/L (ref 10–40)
ANION GAP SERPL CALCULATED.3IONS-SCNC: 10 MMOL/L (ref 3–16)
AST SERPL-CCNC: 58 U/L (ref 15–37)
BASOPHILS ABSOLUTE: 0 K/UL (ref 0–0.2)
BASOPHILS RELATIVE PERCENT: 0.2 %
BETA GLOBULIN: 0.8 G/DL (ref 0.9–1.6)
BILIRUB SERPL-MCNC: 1.6 MG/DL (ref 0–1)
BUN BLDV-MCNC: 3 MG/DL (ref 7–20)
CALCIUM SERPL-MCNC: 9.9 MG/DL (ref 8.3–10.6)
CHLORIDE BLD-SCNC: 99 MMOL/L (ref 99–110)
CO2: 29 MMOL/L (ref 21–32)
CREAT SERPL-MCNC: 0.6 MG/DL (ref 0.9–1.3)
EOSINOPHILS ABSOLUTE: 0.2 K/UL (ref 0–0.6)
EOSINOPHILS RELATIVE PERCENT: 1.8 %
GAMMA GLOBULIN: 1 G/DL (ref 0.6–1.8)
GFR AFRICAN AMERICAN: >60
GFR NON-AFRICAN AMERICAN: >60
GLOBULIN: 3.7 G/DL
GLUCOSE BLD-MCNC: 97 MG/DL (ref 70–99)
HCT VFR BLD CALC: 36.3 % (ref 40.5–52.5)
HEMOGLOBIN: 12.5 G/DL (ref 13.5–17.5)
LIPASE: 210 U/L (ref 13–60)
LYMPHOCYTES ABSOLUTE: 1 K/UL (ref 1–5.1)
LYMPHOCYTES RELATIVE PERCENT: 10.3 %
MCH RBC QN AUTO: 32.6 PG (ref 26–34)
MCHC RBC AUTO-ENTMCNC: 34.5 G/DL (ref 31–36)
MCV RBC AUTO: 94.5 FL (ref 80–100)
MONOCYTES ABSOLUTE: 1.1 K/UL (ref 0–1.3)
MONOCYTES RELATIVE PERCENT: 11 %
NEUTROPHILS ABSOLUTE: 7.4 K/UL (ref 1.7–7.7)
NEUTROPHILS RELATIVE PERCENT: 76.7 %
PDW BLD-RTO: 13.6 % (ref 12.4–15.4)
PLATELET # BLD: 127 K/UL (ref 135–450)
PMV BLD AUTO: 8.3 FL (ref 5–10.5)
POTASSIUM REFLEX MAGNESIUM: 4.4 MMOL/L (ref 3.5–5.1)
RBC # BLD: 3.84 M/UL (ref 4.2–5.9)
SODIUM BLD-SCNC: 138 MMOL/L (ref 136–145)
SPE/IFE INTERPRETATION: NORMAL
TOTAL PROTEIN: 6.2 G/DL (ref 6.4–8.2)
TOTAL PROTEIN: 7.8 G/DL (ref 6.4–8.2)
WBC # BLD: 9.6 K/UL (ref 4–11)

## 2021-10-04 PROCEDURE — 80053 COMPREHEN METABOLIC PANEL: CPT

## 2021-10-04 PROCEDURE — 83690 ASSAY OF LIPASE: CPT

## 2021-10-04 PROCEDURE — 36415 COLL VENOUS BLD VENIPUNCTURE: CPT

## 2021-10-04 PROCEDURE — 2580000003 HC RX 258: Performed by: INTERNAL MEDICINE

## 2021-10-04 PROCEDURE — 85025 COMPLETE CBC W/AUTO DIFF WBC: CPT

## 2021-10-04 RX ADMIN — SODIUM CHLORIDE, POTASSIUM CHLORIDE, SODIUM LACTATE AND CALCIUM CHLORIDE: 600; 310; 30; 20 INJECTION, SOLUTION INTRAVENOUS at 02:15

## 2021-10-04 ASSESSMENT — PAIN SCALES - GENERAL
PAINLEVEL_OUTOF10: 0

## 2021-10-04 NOTE — PROGRESS NOTES
Patient received his discharge papers, he wanted to leave the hospital because he is tired of being in that room. His uncle said he is still about 1-2 hours away and will pick him up as soon as he can. Patient thanked all of us for his care, he left with his cell phone and his back pack walking.

## 2021-10-04 NOTE — PROGRESS NOTES
Came few times to see the patient  He was in the bathroom and not willing to be examined. Discussed with his nurse  He wants to leave today \" regardless\"  Can be DC from neurology  Follow up with his PCP in [place] for MRI brain and EEG  No driving till he is cleared from his PCP  Will sign off.

## 2021-10-04 NOTE — CARE COORDINATION
Please consult the Financial/Benefits counselor to assess insurance eligibility, and medication assistance. Will Meet with patient to provide a list of Addiction Treatment resources, T. [phone] and [facility]ues @ [address], [phone]. Patient should call him/herself for a pre-screen. Social Service will follow up, depending on his insurance, and bed availability. Patient may prefer an Intensive outpatient program, such as [facility], [address] [facility].

## 2021-10-04 NOTE — DISCHARGE SUMMARY
Hospital Medicine Discharge Summary    Patient: lEena Porras     Gender: male  : 1995   Age: 23 y.o. MRN: [id number]    Admitting Physician: Adam Hou MD  Discharge Physician: Adam Hou MD     Code Status: Full Code     Admit Date: 10/1/2021   Discharge Date:   10/4/21    Disposition:  Home  Time spent arranging discharge: 35 minutes    Discharge Diagnoses: Active Hospital Problems    Diagnosis Date Noted    Seizure [facility]) [R56.9]     Alcohol withdrawal syndrome with complication ([facility] [address] [T91.830]     Acute pancreatitis without necrosis or infection, unspecified [K85.90] 10/01/2021   thrombocytopenia        Condition at Discharge:  Stable    Hospital Course:   Interhospital acute alcoholic pancreatitis. Started on IV fluids was able to tolerate clears lipase trended down. Patient did have seizure at home CT head was negative neurology felt is secondary to alcohol abuse. Recommended outpatient MRI brain and EEG with PCP. Patient had thrombocytopenia on was consulted felt it was secondary to bone marrow suppression from alcohol use platelets remained stable patient was discharged home with follow-up with PCP    Discharge Exam:    BP (!) 144/70   Pulse 103   Temp 98.6 °F (37 °C) (Oral)   Resp 18   Ht 6' 2\" (1.88 m)   Wt 184 lb 8 oz (83.7 kg)   SpO2 98%   BMI 23.69 kg/m²   General appearance:  Appears comfortable. AAOx3  HEENT: atraumatic, Pupils equal, muscous membranes moist, no masses appreciated  Cardiovascular: Regular rate and rhythm no murmurs appreciated  Respiratory: CTAB no wheezing  Gastrointestinal: Abdomen soft, non-tender, BS+  EXT: no edema  Neurology: no gross focal deficts  Psychiatry: Appropriate affect. Not agitated  Skin: Warm, dry, no rashes appreciated    Discharge Medications: There are no discharge medications for this patient. There are no discharge medications for this patient. There are no discharge medications for this patient.     There are no discharge medications for this patient. Labs: For convenience and continuity at follow-up the following most recent labs are provided:    Lab Results   Component Value Date    WBC 9.6 10/04/2021    HGB 12.5 10/04/2021    HCT 36.3 10/04/2021    MCV 94.5 10/04/2021     10/04/2021     10/04/2021    K 4.4 10/04/2021    CL 99 10/04/2021    CO2 29 10/04/2021    BUN 3 10/04/2021    CREATININE 0.6 10/04/2021    CALCIUM 9.9 10/04/2021    ALKPHOS 121 10/04/2021    ALT 35 10/04/2021    AST 58 10/04/2021    BILITOT 1.6 10/04/2021    LABALBU 4.1 10/04/2021    TRIG 71 10/02/2021     Lab Results   Component Value Date    INR 1.08 10/02/2021       Radiology:  CT HEAD WO CONTRAST    Result Date: 10/1/2021  EXAMINATION: CT OF THE HEAD WITHOUT CONTRAST  10/1/2021 12:47 pm TECHNIQUE: CT of the head was performed without the administration of intravenous contrast. Dose modulation, iterative reconstruction, and/or weight based adjustment of the mA/kV was utilized to reduce the radiation dose to as low as reasonably achievable. COMPARISON: None. HISTORY: ORDERING SYSTEM PROVIDED HISTORY: seizure/ams TECHNOLOGIST PROVIDED HISTORY: Reason for exam:->seizure/ams Has a \"code stroke\" or \"stroke alert\" been called? ->No Decision Support Exception - unselect if not a suspected or confirmed emergency medical condition->Emergency Medical Condition (MA) Reason for Exam: seizure/ams Acuity: Unknown Type of Exam: Unknown FINDINGS: BRAIN/VENTRICLES: There is no acute intracranial hemorrhage, mass effect or midline shift. No abnormal extra-axial fluid collection. The gray-white differentiation is maintained without evidence of an acute infarct. There is no evidence of hydrocephalus. ORBITS: The visualized portion of the orbits demonstrate no acute abnormality. SINUSES: The visualized paranasal sinuses and mastoid air cells demonstrate no acute abnormality.  SOFT TISSUES/SKULL:  No acute abnormality of the visualized skull or is unremarkable. There is no bowel obstruction. The appendix is normal.  There is thickening of the duodenum secondary to adjacent inflammation. BONES/SOFT TISSUES: There is no acute osseous or soft tissue abnormality. Extensive peripancreatic inflammation and fat stranding and edema of the pancreas consistent with acute pancreatitis. Thickening of the duodenum secondary to adjacent inflammation. Moderate amount of free pelvic fluid. Left basilar atelectasis versus airspace disease. XR CHEST PORTABLE    Result Date: 10/1/2021  EXAMINATION: ONE XRAY VIEW OF THE CHEST 10/1/2021 12:38 pm COMPARISON: None. HISTORY: ORDERING SYSTEM PROVIDED HISTORY: fever/seizure TECHNOLOGIST PROVIDED HISTORY: Reason for exam:->fever/seizure Reason for Exam: Seizures (Tre Brito was with his father and had 3 seizures, BS was 1074 per EMS) Acuity: Unknown Type of Exam: Unknown FINDINGS: The cardiomediastinal silhouette is normal in size. The lungs are clear. No pleural effusion or pneumothorax is present.      No acute cardiopulmonary process         Signed:    Alexa David MD   10/4/2021

## 2021-10-04 NOTE — PROGRESS NOTES
[facility]   Progress Note      10/4/2021 10:17 AM        Name: Maribel Lane . Admitted: 10/1/2021    SUBJECTIVE:  He reports feeling better, no seizure activity, he offers no complaints. Reviewed interval ancillary notes    Current Medications  sodium chloride flush 0.9 % injection 5-40 mL, 2 times per day  sodium chloride flush 0.9 % injection 5-40 mL, PRN  0.9 % sodium chloride infusion, PRN  LORazepam (ATIVAN) tablet 1 mg, Q1H PRN   Or  LORazepam (ATIVAN) injection 1 mg, Q1H PRN   Or  LORazepam (ATIVAN) tablet 2 mg, Q1H PRN   Or  LORazepam (ATIVAN) injection 2 mg, Q1H PRN   Or  LORazepam (ATIVAN) tablet 3 mg, Q1H PRN   Or  LORazepam (ATIVAN) injection 3 mg, Q1H PRN   Or  LORazepam (ATIVAN) tablet 4 mg, Q1H PRN   Or  LORazepam (ATIVAN) injection 4 mg, Q1H PRN  lactated ringers infusion, Continuous  morphine (PF) injection 2 mg, Q4H PRN  sodium chloride flush 0.9 % injection 5-40 mL, 2 times per day  sodium chloride flush 0.9 % injection 5-40 mL, PRN  0.9 % sodium chloride infusion, PRN  enoxaparin (LOVENOX) injection 40 mg, Daily  ondansetron (ZOFRAN-ODT) disintegrating tablet 4 mg, Q8H PRN   Or  ondansetron (ZOFRAN) injection 4 mg, Q6H PRN  polyethylene glycol (GLYCOLAX) packet 17 g, Daily PRN  acetaminophen (TYLENOL) tablet 650 mg, Q6H PRN   Or  acetaminophen (TYLENOL) suppository 650 mg, Q6H PRN  oxyCODONE (ROXICODONE) immediate release tablet 5 mg, Q6H PRN        Objective:  BP (!) 144/70   Pulse 103   Temp 98.6 °F (37 °C) (Oral)   Resp 18   Ht 6' 2\" (1.88 m)   Wt 184 lb 8 oz (83.7 kg)   SpO2 98%   BMI 23.69 kg/m²     Intake/Output Summary (Last 24 hours) at 10/4/2021 1017  Last data filed at 10/4/2021 0611  Gross per 24 hour   Intake 4057.58 ml   Output 1450 ml   Net 2607.58 ml      Wt Readings from Last 3 Encounters:   10/04/21 184 lb 8 oz (83.7 kg)       General appearance:  Appears comfortable  Eyes: Sclera clear. Pupils equal.  ENT: Moist oral mucosa.  Trachea midline, no adenopathy. Cardiovascular: Regular rhythm, normal S1, S2. No murmur. No edema in lower extremities  Respiratory: Not using accessory muscles. Good inspiratory effort. Clear to auscultation bilaterally, no wheeze or crackles. GI: Abdomen soft, no tenderness, not distended  Musculoskeletal: No cyanosis in digits, neck supple  Neurology: CN 2-12 grossly intact. No speech or motor deficits  Psych: Normal affect. Alert and oriented in time, place and person  Skin: Warm, dry, normal turgor    Labs and Tests:  CBC:   Recent Labs     10/02/21  0435 10/03/21  0944 10/04/21  0447   WBC 10.0 10.1 9.6   HGB 12.6* 12.2* 12.5*   PLT 94* 97* 127*     BMP:    Recent Labs     10/02/21  0435 10/03/21  0944 10/04/21  0447    134* 138   K 3.3* 3.6 4.4    96* 99   CO2 23 29 29   BUN 7 3* 3*   CREATININE 0.6* 0.7* 0.6*   GLUCOSE 79 141* 97     Hepatic:   Recent Labs     10/02/21  0435 10/03/21  0944 10/04/21  0447   AST 59* 73* 58*   ALT 21 30 35   BILITOT 0.8 1.1* 1.6*   ALKPHOS 60 100 121       ASSESSMENT AND PLAN    Active Problems:    Acute pancreatitis without necrosis or infection, unspecified    Seizure (HCC)    Alcohol withdrawal syndrome with complication (HCC)  Resolved Problems:    * No resolved hospital problems. *      1. Thrombocytopenia. CBC from today pending. -   no suspicion for TTP given normal haptoglobin. DIC labs negative. - likely due to bone marrow suppression from alcohol abuse. - plts 127k today     2. Anemia--Labs negative so far. 3. Pancreatitis  - improving     4. Seizure  - Neuro following. Ledy Gan CNP  [facility]    Patient was seen and examined. Agree with above. Continue current care.     Reji Damon MD

## 2021-10-05 LAB
BLOOD CULTURE, ROUTINE: NORMAL
CULTURE, BLOOD 2: NORMAL

## 2021-10-09 NOTE — PROGRESS NOTES
Physician Progress Note      PATIENT:               Hillary Marsh  CSN #:                  [id number]  :                       1995  ADMIT DATE:       10/1/2021 12:21 PM  [address] [place] DATE:        10/4/2021 7:17 PM  RESPONDING  PROVIDER #:        Jose Rodriguez MD          QUERY TEXT:    Patient admitted with Acute pancreatitis likely secondary to ETOH. Noted   documentation of Alcohol withdrawal syndrome with complication  in ED note and   in subsequent Active problem lists. Please document if the diagnosis of   Alcohol withdrawal syndrome with complication has been ruled in or ruled out   after further study. The medical record reflects the following:  Risk Factors: Hx ETOH abuse. .. last drink 2 days before admit  Clinical Indicators: Per ED: \"He is confused. .. Diagnosed with Alcohol   withdrawal syndrome with complication. \"; Per Nursing on 10/3: \". ..restless,   fidgeting, sweating, c/o headache, and has N&V. \"  Treatment: Ativan per [facility] orders  Options provided:  -- Alcohol withdrawal syndrome with complication present this admit  -- Alcohol withdrawal syndrome with complication was ruled out  -- Other - I will add my own diagnosis  -- Disagree - Not applicable / Not valid  -- Disagree - Clinically unable to determine / Unknown  -- Refer to Clinical Documentation Reviewer    PROVIDER RESPONSE TEXT:    Alcohol withdrawal syndrome with complication was present this during   admission.     Query created by: Jake Killian on 10/8/2021 8:28 AM      Electronically signed by:  Jose Rodriguez MD 10/9/2021 11:18 AM